# Patient Record
Sex: FEMALE | Race: WHITE | NOT HISPANIC OR LATINO
[De-identification: names, ages, dates, MRNs, and addresses within clinical notes are randomized per-mention and may not be internally consistent; named-entity substitution may affect disease eponyms.]

---

## 2022-08-20 ENCOUNTER — NON-APPOINTMENT (OUTPATIENT)
Age: 27
End: 2022-08-20

## 2022-08-26 PROBLEM — Z00.00 ENCOUNTER FOR PREVENTIVE HEALTH EXAMINATION: Status: ACTIVE | Noted: 2022-08-26

## 2022-09-11 ENCOUNTER — NON-APPOINTMENT (OUTPATIENT)
Age: 27
End: 2022-09-11

## 2022-09-12 ENCOUNTER — APPOINTMENT (OUTPATIENT)
Dept: ENDOCRINOLOGY | Facility: CLINIC | Age: 27
End: 2022-09-12

## 2022-09-12 DIAGNOSIS — R63.5 ABNORMAL WEIGHT GAIN: ICD-10-CM

## 2022-09-12 DIAGNOSIS — E05.00 THYROTOXICOSIS WITH DIFFUSE GOITER W/OUT THYROTOXIC CRISIS OR STORM: ICD-10-CM

## 2022-09-12 DIAGNOSIS — E04.9 NONTOXIC GOITER, UNSPECIFIED: ICD-10-CM

## 2022-09-12 PROCEDURE — 99203 OFFICE O/P NEW LOW 30 MIN: CPT | Mod: 95

## 2022-09-12 NOTE — HISTORY OF PRESENT ILLNESS
[Home] : at home, [unfilled] , at the time of the visit. [Medical Office: ([facility])___] : at the medical office located in  [Verbal consent obtained from patient] : the patient, [unfilled] [FreeTextEntry1] : Sep 12, 2022  -       Videochat using [employer] \par \par PCP:   Dr. Anabella Odell at [facility]  [ [phone]\par           Bariatric surgeon:  Dr. Raji Leary  p    f  \par \par CC:  Hyperthyroid 8/19/22 [facility] T4 11.2, T3 180, TSH 0.012, TSI 15.5  \par        (low vit D  17.2)\par        (elevated BMI  weight 330 lbs, 5 ft 5 in) \par \par \par 25 yo daughter of Melly Palma.   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   .  \par Last year moved from [place] ([facility]  to [facility]par GI doctor saw her for diarrhea.  \par On phentermine per [facility] bariatric surgeon,  Dr. Raji Leary,  for about 5 weeks *\par \par Patient's mother (Melly Ramos)  encouraged patient to get my advice regarding recently diagnosed overactive thyroid.\par She was on a cruise to [place] with a relative, so was not able to send lab reports until return.\par [facility] tests from 8/20/22 ordered by endocrinologist Casa Ward of [facility] p  includes:\par \par TSH 0.012\par TSH Receptor ab 12.2 (0-1.75)\par TSI 15.50 (0-0.55)\par T3 180 ()\par vit D 17.6\par \par \par Labs performed 7/13/21 requested by Dr. OREN Solitario included\par HDL 39\par \par TSH 0.462 (0.45-4.50)\par vit D 15.8\par \par \par Impression:    New onset of hyperthyroidism due to Graves' disease.  Currently the hyperthyroidism is mild.\par We discussed options including surgery, methimazole, I-131, or a brief period of surveillance to assess the\par trajectory.  I am in favor of surveillance and I have asked her to return to [facility] in about 4 weeks for another\par set of TFTs and to also go for ultrasound to assess and apparent small goiter.   If hyperthyroidism is \par progressing or she is becoming symptomatic, methimazole would likely be the next step.\par \par She finds the phentermine is helping to suppress her appetite.    I believe it would be prudent to put the phentermine on hold until the issue of hyperthyroidism is resolved.     In the meantime, I asked her to collect a 24 hour urine for free cortisol.  \par \par She will start OTC vitamin D 1000 iu (25 mcg) daily - treated previously in [place].  \par \par

## 2022-10-19 ENCOUNTER — APPOINTMENT (OUTPATIENT)
Dept: ENDOCRINOLOGY | Facility: CLINIC | Age: 27
End: 2022-10-19

## 2022-10-29 ENCOUNTER — RESULT REVIEW (OUTPATIENT)
Age: 27
End: 2022-10-29